# Patient Record
Sex: MALE | Race: BLACK OR AFRICAN AMERICAN | NOT HISPANIC OR LATINO | URBAN - METROPOLITAN AREA
[De-identification: names, ages, dates, MRNs, and addresses within clinical notes are randomized per-mention and may not be internally consistent; named-entity substitution may affect disease eponyms.]

---

## 2022-04-22 ENCOUNTER — OFFICE (OUTPATIENT)
Dept: URBAN - METROPOLITAN AREA CLINIC 11 | Facility: CLINIC | Age: 42
End: 2022-04-22

## 2022-04-22 VITALS
HEART RATE: 103 BPM | OXYGEN SATURATION: 100 % | SYSTOLIC BLOOD PRESSURE: 145 MMHG | DIASTOLIC BLOOD PRESSURE: 86 MMHG | HEIGHT: 70 IN | WEIGHT: 207 LBS

## 2022-04-22 DIAGNOSIS — K64.8 OTHER HEMORRHOIDS: ICD-10-CM

## 2022-04-22 DIAGNOSIS — K92.1 MELENA: ICD-10-CM

## 2022-04-22 PROCEDURE — 99204 OFFICE O/P NEW MOD 45 MIN: CPT | Performed by: NURSE PRACTITIONER

## 2022-04-22 RX ORDER — SODIUM PICOSULFATE, MAGNESIUM OXIDE, AND ANHYDROUS CITRIC ACID 10; 3.5; 12 MG/160ML; G/160ML; G/160ML
LIQUID ORAL
Qty: 320 | Refills: 0 | Status: COMPLETED
Start: 2022-04-22 | End: 2022-05-19

## 2022-04-22 RX ORDER — HYDROCORTISONE ACETATE AND PRAMOXINE HYDROCHLORIDE 25; 10 MG/G; MG/G
CREAM TOPICAL
Qty: 1 | Refills: 6 | Status: COMPLETED
Start: 2022-04-22 | End: 2022-05-19

## 2022-04-22 NOTE — SERVICENOTES
We discussed the presence of his hematochezia and and hemorrhoids. We will start treatment for the hemorrhoids and plan for colonoscopy to r/o other origins of the rectal bleeding. We discussed the procedure including r/b/a.

## 2022-04-22 NOTE — SERVICEHPINOTES
Mr. Solorio is a 41 year old male that presents for rectal bleeding. He notes that he was having trouble with hemorrhoids being swollen and uncomfortable. He notes that they flare every now and again, but he was concerned when he started seeing the blood. He inserted a suppository and noticed bleeding afterward. He notes that he has regular bowel movements 2-3 times per day. Stools are formed and soft. He denies presence of heartburn, reflux, dysphagia, and abdominal pain. brian Rmaos immediate family history of colon polyps or colon cancer.

## 2022-05-19 ENCOUNTER — AMBULATORY SURGICAL CENTER (OUTPATIENT)
Dept: URBAN - METROPOLITAN AREA SURGERY 3 | Facility: SURGERY | Age: 42
End: 2022-05-19
Payer: COMMERCIAL

## 2022-05-19 ENCOUNTER — OFFICE (OUTPATIENT)
Dept: URBAN - METROPOLITAN AREA PATHOLOGY 22 | Facility: PATHOLOGY | Age: 42
End: 2022-05-19
Payer: COMMERCIAL

## 2022-05-19 VITALS
HEART RATE: 89 BPM | OXYGEN SATURATION: 99 % | OXYGEN SATURATION: 100 % | DIASTOLIC BLOOD PRESSURE: 74 MMHG | RESPIRATION RATE: 18 BRPM | DIASTOLIC BLOOD PRESSURE: 74 MMHG | SYSTOLIC BLOOD PRESSURE: 140 MMHG | HEIGHT: 70 IN | HEIGHT: 70 IN | TEMPERATURE: 97.9 F | RESPIRATION RATE: 17 BRPM | RESPIRATION RATE: 16 BRPM | RESPIRATION RATE: 17 BRPM | HEART RATE: 79 BPM | RESPIRATION RATE: 18 BRPM | DIASTOLIC BLOOD PRESSURE: 77 MMHG | SYSTOLIC BLOOD PRESSURE: 151 MMHG | SYSTOLIC BLOOD PRESSURE: 151 MMHG | HEART RATE: 79 BPM | SYSTOLIC BLOOD PRESSURE: 151 MMHG | DIASTOLIC BLOOD PRESSURE: 96 MMHG | SYSTOLIC BLOOD PRESSURE: 140 MMHG | DIASTOLIC BLOOD PRESSURE: 55 MMHG | RESPIRATION RATE: 21 BRPM | OXYGEN SATURATION: 98 % | HEART RATE: 89 BPM | RESPIRATION RATE: 14 BRPM | RESPIRATION RATE: 21 BRPM | RESPIRATION RATE: 14 BRPM | SYSTOLIC BLOOD PRESSURE: 125 MMHG | DIASTOLIC BLOOD PRESSURE: 76 MMHG | OXYGEN SATURATION: 99 % | RESPIRATION RATE: 17 BRPM | HEIGHT: 70 IN | RESPIRATION RATE: 21 BRPM | SYSTOLIC BLOOD PRESSURE: 95 MMHG | TEMPERATURE: 97.9 F | DIASTOLIC BLOOD PRESSURE: 96 MMHG | SYSTOLIC BLOOD PRESSURE: 125 MMHG | DIASTOLIC BLOOD PRESSURE: 76 MMHG | DIASTOLIC BLOOD PRESSURE: 96 MMHG | SYSTOLIC BLOOD PRESSURE: 140 MMHG | OXYGEN SATURATION: 98 % | OXYGEN SATURATION: 100 % | WEIGHT: 205 LBS | DIASTOLIC BLOOD PRESSURE: 76 MMHG | WEIGHT: 205 LBS | SYSTOLIC BLOOD PRESSURE: 147 MMHG | OXYGEN SATURATION: 98 % | TEMPERATURE: 97.9 F | HEART RATE: 81 BPM | OXYGEN SATURATION: 99 % | DIASTOLIC BLOOD PRESSURE: 55 MMHG | HEART RATE: 79 BPM | SYSTOLIC BLOOD PRESSURE: 95 MMHG | RESPIRATION RATE: 16 BRPM | HEART RATE: 81 BPM | RESPIRATION RATE: 14 BRPM | DIASTOLIC BLOOD PRESSURE: 77 MMHG | SYSTOLIC BLOOD PRESSURE: 147 MMHG | HEART RATE: 89 BPM | SYSTOLIC BLOOD PRESSURE: 125 MMHG | RESPIRATION RATE: 18 BRPM | DIASTOLIC BLOOD PRESSURE: 55 MMHG | SYSTOLIC BLOOD PRESSURE: 95 MMHG | HEART RATE: 81 BPM | DIASTOLIC BLOOD PRESSURE: 74 MMHG | RESPIRATION RATE: 16 BRPM | OXYGEN SATURATION: 100 % | DIASTOLIC BLOOD PRESSURE: 77 MMHG | SYSTOLIC BLOOD PRESSURE: 147 MMHG | WEIGHT: 205 LBS

## 2022-05-19 DIAGNOSIS — K92.1 MELENA: ICD-10-CM

## 2022-05-19 DIAGNOSIS — K63.89 OTHER SPECIFIED DISEASES OF INTESTINE: ICD-10-CM

## 2022-05-19 PROCEDURE — 45380 COLONOSCOPY AND BIOPSY: CPT | Performed by: INTERNAL MEDICINE

## 2022-05-19 PROCEDURE — 88305 TISSUE EXAM BY PATHOLOGIST: CPT | Performed by: STUDENT IN AN ORGANIZED HEALTH CARE EDUCATION/TRAINING PROGRAM
